# Patient Record
Sex: MALE | URBAN - METROPOLITAN AREA
[De-identification: names, ages, dates, MRNs, and addresses within clinical notes are randomized per-mention and may not be internally consistent; named-entity substitution may affect disease eponyms.]

---

## 2016-04-11 LAB
ALT SERPL-CCNC: 24 U/L (ref 14–67)
AST SERPL-CCNC: 22 U/L (ref 6–58)
CREAT SERPL-MCNC: 0.79 MG/DL (ref 0.62–1.66)
GFR SERPL CREATININE-BSD FRML MDRD: >60 ML/MIN/1.73M2
GLUCOSE SERPL-MCNC: 88 MG/DL (ref 60–100)
POTASSIUM SERPL-SCNC: 5 MMOL/L (ref 3.5–5.1)

## 2016-07-11 LAB
ALT SERPL-CCNC: 21 U/L (ref 14–67)
AST SERPL-CCNC: 27 U/L (ref 6–58)
CREAT SERPL-MCNC: 0.92 MG/DL (ref 0.62–1.66)
GFR SERPL CREATININE-BSD FRML MDRD: >60 ML/MIN/1.73M2
GLUCOSE SERPL-MCNC: 91 MG/DL (ref 60–100)
POTASSIUM SERPL-SCNC: 4.9 MMOL/L (ref 3.5–5.1)

## 2017-02-22 ENCOUNTER — TRANSFERRED RECORDS (OUTPATIENT)
Dept: HEALTH INFORMATION MANAGEMENT | Facility: CLINIC | Age: 72
End: 2017-02-22

## 2017-06-26 ENCOUNTER — TRANSFERRED RECORDS (OUTPATIENT)
Dept: HEALTH INFORMATION MANAGEMENT | Facility: CLINIC | Age: 72
End: 2017-06-26

## 2018-01-01 ENCOUNTER — TELEPHONE (OUTPATIENT)
Dept: TRANSPLANT | Facility: CLINIC | Age: 73
End: 2018-01-01

## 2018-01-01 ENCOUNTER — TRANSFERRED RECORDS (OUTPATIENT)
Dept: HEALTH INFORMATION MANAGEMENT | Facility: CLINIC | Age: 73
End: 2018-01-01

## 2018-01-01 LAB
ALT SERPL-CCNC: 19 U/L (ref 3–78)
AST SERPL-CCNC: 31 U/L (ref 3–70)
CHOLEST SERPL-MCNC: 95 MG/DL (ref 100–199)
CREAT SERPL-MCNC: 1.3 MG/DL (ref 0.5–1.4)
GFR SERPL CREATININE-BSD FRML MDRD: 57.8 ML/MIN/1.73M2
GLUCOSE SERPL-MCNC: 0 MG/DL (ref 64–99)
HDLC SERPL-MCNC: 48 MG/DL (ref 40–60)
INR PPP: 1.26 (ref 0.9–1.1)
LDLC SERPL CALC-MCNC: 37 MG/DL
POTASSIUM SERPL-SCNC: 4.4 MMOL/L (ref 3.3–5)
TRIGL SERPL-MCNC: 49 MG/DL (ref 30–149)
TSH SERPL-ACNC: 6.49 UIU/ML (ref 0.4–5)

## 2018-02-02 NOTE — TELEPHONE ENCOUNTER
Provider Call: Transplant Lab  Facility Name:[facility]   Facility Location: [facility]   Reason for Call: regarding the patient  Callback needed? Dr. Tamera Muir nephrologist would a call on [phone]

## 2018-02-02 NOTE — TELEPHONE ENCOUNTER
Callback from Dr. Negrete. She follows pt after 22 years post transplant. States FK levels have been running around 7.5 and questions if ok to decrease as he has hx of SCC, following with derm q 3 months. Advised recommendation on FK level closer to 5. She agrees and will let [facility] know if there are any further questions.

## 2018-03-09 NOTE — TELEPHONE ENCOUNTER
Called patient and explained he should discuss this with a pharmacist at speciality pharmacy, transferred call.

## 2018-03-09 NOTE — TELEPHONE ENCOUNTER
Questions about if it is safe for him to take a supplement Nicotinamide (dermatologist advising he take). Pls call to advise.

## 2018-09-19 NOTE — TELEPHONE ENCOUNTER
I called Dr. Quezada back. Pt was diagnosed with PTLD, B cell lymphoma. Has about a 10 cm mass in colon and another on liver. He is being discussed today at oncology conference to determine best treatment plan. Considering ritux or R-CHOP. She wants to know what to change on his IS. Since this patient is not followed here, I asked that she speak directly with the transplant physicians when they have a confirmed treatment plan for PTLD and a timeline in place.

## 2018-09-19 NOTE — TELEPHONE ENCOUNTER
Provider Call: Transplant Provider   Dr. Tamera Quezada, Nephrologist  Route to RN  Facility Name: [facility]  Facility Location: [place], [place]  Reason for Call: discuss patient  Callback needed? Yes    Return Call Needed  Same as documented in contacts section  When to return call?: Same day: Route [facility]

## 2018-09-19 NOTE — TELEPHONE ENCOUNTER
Received page from Dr. Sullivan in [place], indicates that patient was recently diagnosed with a B Cell Lympoma and that they plan to treat with Rituximab and CHOP starting as soon as possible as patient is deteriorating.  She is wondering about suggestions for immunosuppression medication adjustment r/t cancer diagnosis.      Reviewed note documented by Karen Leblanc from today, indicated that patients local nephrologist is following for transplant care and would like Nephrologist to speak with Transplant Nephrology for suggestions once plan of care for treatment is established.  Will f/u with Karen re: treatment plan as above.

## 2021-08-18 ENCOUNTER — POST MORTEM DOCUMENTATION (OUTPATIENT)
Dept: TRANSPLANT | Facility: CLINIC | Age: 76
End: 2021-08-18

## 2021-08-18 NOTE — PROGRESS NOTES
Received notification of patient's death from [facility] f/up team.  Place of death was reported as unknown.  Graft status at the time of death was reported as Functioning.  Additional information:Hospitalized at [facility] in [place], [place]. 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 Had GI bleeding from multiple lymphoma sites along with  NSTEMI. Not much could be done therapeutically and patient was made comfort cares.  TIS verification is: Complete